# Patient Record
Sex: MALE | Race: WHITE | URBAN - METROPOLITAN AREA
[De-identification: names, ages, dates, MRNs, and addresses within clinical notes are randomized per-mention and may not be internally consistent; named-entity substitution may affect disease eponyms.]

---

## 2017-03-21 ENCOUNTER — HISTORICAL (OUTPATIENT)
Dept: LAB | Facility: HOSPITAL | Age: 42
End: 2017-03-21

## 2017-11-09 ENCOUNTER — HISTORICAL (OUTPATIENT)
Dept: INTERNAL MEDICINE | Facility: CLINIC | Age: 42
End: 2017-11-09

## 2020-01-18 ENCOUNTER — HOSPITAL ENCOUNTER (OUTPATIENT)
Dept: ADMINISTRATIVE | Facility: HOSPITAL | Age: 45
End: 2020-01-18
Attending: INTERNAL MEDICINE | Admitting: INTERNAL MEDICINE

## 2022-04-07 ENCOUNTER — HISTORICAL (OUTPATIENT)
Dept: ADMINISTRATIVE | Facility: HOSPITAL | Age: 47
End: 2022-04-07

## 2022-04-24 VITALS
BODY MASS INDEX: 46.65 KG/M2 | DIASTOLIC BLOOD PRESSURE: 88 MMHG | HEIGHT: 69 IN | WEIGHT: 315 LBS | SYSTOLIC BLOOD PRESSURE: 143 MMHG

## 2022-04-30 NOTE — ED PROVIDER NOTES
Patient:   Seymour Alatorre             MRN: [id number]            FIN: [id number]               Age:   44 years     Sex:  Male     :  1975   Associated Diagnoses:   Chest pain   Author:   Hua Dolan MD      Basic Information   Time seen: Date 2020.   History source: Patient.   Arrival mode: Private vehicle, wheelchair.   History limitation: None.   Additional information: Chief Complaint from Nursing Triage Note : Chief Complaint   2020 10:38 CST      Chief Complaint           pt c/o left chest wall pain w/ tongue numbness x 1 hr PTA.  .      History of Present Illness   The patient presents with chest pain.  The onset was 1  hours ago.  The course/duration of symptoms is resolved: lasted 20 minute(s).  Location: Left anterior chest. Radiating pain: right shoulder. The character of symptoms is heaviness and pressure.  The degree at maximum was severe.  The degree at present is none.  The exacerbating factor is none.  The relieving factor is none.  Risk factors consist of hypertension and obesity.  Therapy today None.  Associated symptoms: nausea, diaphoresis and denies shortness of breath.        Review of Systems   Constitutional symptoms:  Weakness.   Skin symptoms:  Negative except as documented in HPI.   Eye symptoms:  Negative except as documented in HPI.   ENMT symptoms:  Negative except as documented in HPI.   Respiratory symptoms:  Negative except as documented in HPI.   Cardiovascular symptoms:  Negative except as documented in HPI.   Gastrointestinal symptoms:  Negative except as documented in HPI.   Genitourinary symptoms:  Negative except as documented in HPI.   Musculoskeletal symptoms:  Negative except as documented in HPI.   Neurologic symptoms:  Negative except as documented in HPI.   Psychiatric symptoms:  Negative except as documented in HPI.   Endocrine symptoms:  Negative except as documented in HPI.   Hematologic/Lymphatic symptoms:  Negative except as  documented in HPI.   Allergy/immunologic symptoms:  Negative except as documented in HPI.             Additional review of systems information: All other systems reviewed and otherwise negative.      Health Status   Allergies:    Allergic Reactions (Selected)  No Known Allergies.   Medications:  (Selected)   Inpatient Medications  Ordered  aspirin 81 mg oral tablet, CHEWABLE: 324 mg, form: Tab-Chew, Oral, Once, first dose 01/18/20 11:02:00 CST, stop date 01/18/20 11:02:00 CST, STAT, 4 chew tab = 5 grain ASA  Prescriptions  Prescribed  amlodipine 10 mg oral tablet: 10 mg = 1 tab(s), Oral, Daily, # 30 tab(s), 5 Refill(s), Pharmacy: [facility] [id number]  Documented Medications  Documented  Enbrel SureClick 50 mg/mL subcutaneous solution: 50 mg, Subcutaneous  Humira 40 mg/0.8 mL subcutaneous solution: 40 mg = 0.8 mL, Subcutaneous, q2wk, # 1 kit(s), 0 Refill(s)  acetaminophen-hydrocodone 325 mg-5 mg oral tablet: 1 tab(s), Oral, q8hr  amoxicillin 500 mg oral tablet: 500 mg = 1 tab(s), Oral, QID  diclofenac sodium 75 mg oral delayed release tablet: 75 mg = 1 tab(s), Oral, BID  enalapril 2.5 mg oral tablet: 2.5 mg = 1 tab(s), Oral, Daily  gabapentin 300 mg oral capsule: 300 mg = 1 cap(s), Oral, TID  ibuprofen 800 mg oral tablet: 800 mg = 1 tab(s), Oral, q6-8hr  lisinopril 10 mg oral tablet: 10 mg = 1 tab(s), Oral, Daily  sertraline 25 mg oral tablet: 25 mg = 1 tab(s), Oral, Daily  traMADol 50 mg oral tablet: 50 mg = 1 tab(s), Oral, q12hr, PRN PRN for pain, # 30 tab(s), 0 Refill(s).      Past Medical/ Family/ Social History   Medical history:    Active  left hand lesion ([id number])  Hypertension (64307537)  Acute chest pain (682291234)  Comments:  8/4/2012 CDT 22:33 CDT - Kevin HOOPER, Ariela Bustillo  last episode 6 months ago  Chronic back pain ([id number])  Comments:  8/4/2012 CDT 22:34 CDT - Kevin HOOPER, Ariela Bustillo  arthritis pain in knees and back  Anxiety (99410932)  ADHD - Attention deficit disorder with hyperactivity  (2179900126)  Psoriasis (01165004).   Surgical history:    Reconstruction of ligament of knee joint ([id number]).  Comments:  8/4/2012 22:41 Ariela Venegas RN  x 2 ub 1998 abd 1999  ORIF femur.  Comments:  8/4/2012 22:42 Ariela Venegas RN  1998  Cholecystectomy ([id number]).  Comments:  8/4/2012 22:42 Ariela Venegas RN  2009  ORIF collar bone.  Comments:  8/4/2012 22:42 Ariela Venegas RN  1991  ORIF arm.  Injury, knee. ([id number]).  Femur ([id number]).  gall bladder ([id number])..   Social history:    Social & Psychosocial Habits    Alcohol  08/04/2012 Risk Assessment: Denies Alcohol Use    12/27/2016  Use: Current    Frequency: 1-2 times per week    01/18/2020  Use: Current    Type: Beer    Frequency: 1-2 times per week    Employment/School  11/01/2017  Status: Disable    Highest education: High school    Home/Environment  11/01/2017  Lives with: Children    Living situation: Home/Independent    Alcohol abuse in household: No    Substance abuse in household: No    Smoker in household: No    Feels unsafe at home: No    Safe place to go: Yes    Family/Friends available to help: Yes    Concern for family members at home: No    Major illness in household: No    Nutrition/Health  11/01/2017  Type of diet: Regular    Wants to lose weight: Yes    Sleeping concerns: Yes    Feels highly stressed: Yes    Substance Use  08/04/2012 Risk Assessment: Denies Substance Abuse    11/01/2017  Use: Current    Type: Marijuana    Frequency: Daily    Comment: last used 8/7/12 @ 0400 - 08/07/2012 06:12 - Miranda Drake RN    01/18/2020  Use: Current    Type: Marijuana    Tobacco  08/04/2012 Risk Assessment: Denies Tobacco Use    12/27/2016  Use: Never smoker    12/04/2018  Use: Never smoker    Patient Wants Consult For Cessation Counseling N/A    02/19/2019  Use: 4 or less cigarettes(less    Type: Cigarettes    Patient Wants Consult For Cessation Counseling No     Comment: Weed - 02/19/2019 14:42 - [employer] LRPSGT/EEG Tech, [place]    04/15/2019  Use: 5-9 cigarettes (between 1    Patient Wants Consult For Cessation Counseling No    Comment: Smokes marijuana daily - 04/15/2019 15:01 - [employer] LRPSGT/EEG Tech, [place]    08/13/2019  Use: 4 or less cigarettes(less    Patient Wants Consult For Cessation Counseling No    Comment: Smokes a few joints daily - 08/13/2019 15:50 - [employer] LRPSGT/EEG Tech, Ewelina    01/18/2020  Use: Former smoker, quit more    Patient Wants Consult For Cessation Counseling N/A    Abuse/Neglect  08/13/2019  SHX Any signs of abuse or neglect No    01/18/2020  SHX Any signs of abuse or neglect No  .      Physical Examination               Vital Signs   Vital Signs   1/18/2020 10:38 CST      Temperature Oral          36.9 DegC                             Temperature Oral (calculated)             98.42 DegF                             Peripheral Pulse Rate     101 bpm  HI                             Respiratory Rate          24 br/min                             SpO2                      97 %                             Oxygen Therapy            Room air                             Systolic Blood Pressure   153 mmHg  HI                             Diastolic Blood Pressure  81 mmHg  .   General:  Alert, no acute distress.    Skin:  Warm, dry, intact, no rash.    Head:  Atraumatic.   Neck:  Supple.   Eye:  Normal conjunctiva.   Ears, nose, mouth and throat:  Oral mucosa moist.   Cardiovascular:  Regular rate and rhythm, No murmur.    Respiratory:  Lungs are clear to auscultation, respirations are non-labored.    Chest wall:  No tenderness.   Musculoskeletal:  Normal ROM, normal strength.    Gastrointestinal:  Soft, Nontender, Non distended, Normal bowel sounds, Guarding: Negative, Rebound: Negative.    Neurological:  Alert and oriented to person, place, time, and situation, No focal neurological deficit observed.       Medical Decision Making   Differential  Diagnosis:  Myocardial infarction, non-ST elevation myocardial infarction, unstable angina, anxiety, aortic dissection, gastroesophageal reflux disease.    Documents reviewed:  Emergency department nurses' notes.   Electrocardiogram:  Time 1/18/2020 10:42:00, rate 84, normal sinus rhythm, No ST-T changes, no ectopy, normal MO & QRS intervals, EP Interp.    Results review:  Interpretation Labs unremarkable.   Radiology results:  Rad Results (ST)  < 12 hrs   Accession: [id number]  Order: XR Chest 2 Views  Report Dt/Tm: 01/18/2020 11:24  Report:   XR Chest 2 Views     REASON FOR STUDY: Chest Pain     TECHNIQUE: Frontal and lateral radiographs of the chest     COMPARISON: 11/17/2017     FINDINGS: Cardiac silhouette and mediastinal contours are within  normal limits. The lungs are well-inflated. No consolidation  identified. There is no pleural effusion or discernible pneumothorax.          IMPRESSION:      No acute cardiopulmonary process.          .      Impression and Plan   Diagnosis   Chest pain ([id number] R07.9)      Calls-Consults   -  Sean DUKES, Pankaj MERCADO   Plan   Condition: Stable.    Disposition: Admit time  1/18/2020 11:58:00, Place in Observation Telemetry Unit.